# Patient Record
Sex: FEMALE | Employment: FULL TIME | URBAN - METROPOLITAN AREA
[De-identification: names, ages, dates, MRNs, and addresses within clinical notes are randomized per-mention and may not be internally consistent; named-entity substitution may affect disease eponyms.]

---

## 2024-03-01 ENCOUNTER — APPOINTMENT (OUTPATIENT)
Dept: GENERAL RADIOLOGY | Age: 39
End: 2024-03-01

## 2024-03-01 ENCOUNTER — APPOINTMENT (OUTPATIENT)
Dept: CT IMAGING | Age: 39
End: 2024-03-01

## 2024-03-01 ENCOUNTER — HOSPITAL ENCOUNTER (EMERGENCY)
Age: 39
Discharge: HOME | End: 2024-03-01
Attending: EMERGENCY MEDICINE

## 2024-03-01 VITALS
OXYGEN SATURATION: 96 % | WEIGHT: 235 LBS | RESPIRATION RATE: 16 BRPM | HEART RATE: 100 BPM | SYSTOLIC BLOOD PRESSURE: 137 MMHG | TEMPERATURE: 98.2 F | BODY MASS INDEX: 37.77 KG/M2 | DIASTOLIC BLOOD PRESSURE: 104 MMHG | HEIGHT: 66 IN

## 2024-03-01 DIAGNOSIS — J84.10 LUNG GRANULOMA (HCC): Primary | ICD-10-CM

## 2024-03-01 DIAGNOSIS — R03.0 ELEVATED BLOOD PRESSURE READING: ICD-10-CM

## 2024-03-01 LAB
ALBUMIN SERPL-MCNC: 4.7 G/DL (ref 3.5–5.2)
ALBUMIN/GLOB SERPL: 1.6 {RATIO} (ref 1–2.5)
ALP SERPL-CCNC: 89 U/L (ref 35–104)
ALT SERPL-CCNC: 42 U/L (ref 5–33)
ANION GAP SERPL CALCULATED.3IONS-SCNC: 12 MMOL/L (ref 9–17)
AST SERPL-CCNC: 30 U/L
BASOPHILS # BLD: 0.2 K/UL (ref 0–0.2)
BASOPHILS NFR BLD: 2 % (ref 0–2)
BILIRUB SERPL-MCNC: 0.7 MG/DL (ref 0.3–1.2)
BUN SERPL-MCNC: 18 MG/DL (ref 6–20)
CALCIUM SERPL-MCNC: 10.1 MG/DL (ref 8.6–10.4)
CHLORIDE SERPL-SCNC: 100 MMOL/L (ref 98–107)
CO2 SERPL-SCNC: 26 MMOL/L (ref 20–31)
CREAT SERPL-MCNC: 0.8 MG/DL (ref 0.5–0.9)
EOSINOPHIL # BLD: 0.1 K/UL (ref 0–0.4)
EOSINOPHILS RELATIVE PERCENT: 1 % (ref 1–4)
ERYTHROCYTE [DISTWIDTH] IN BLOOD BY AUTOMATED COUNT: 12.8 % (ref 12.5–15.4)
GFR SERPL CREATININE-BSD FRML MDRD: >60 ML/MIN/1.73M2
GLUCOSE SERPL-MCNC: 107 MG/DL (ref 70–99)
HCT VFR BLD AUTO: 39.7 % (ref 36–46)
HGB BLD-MCNC: 14 G/DL (ref 12–16)
LYMPHOCYTES NFR BLD: 1.8 K/UL (ref 1–4.8)
LYMPHOCYTES RELATIVE PERCENT: 26 % (ref 24–44)
MCH RBC QN AUTO: 33.4 PG (ref 26–34)
MCHC RBC AUTO-ENTMCNC: 35.4 G/DL (ref 31–37)
MCV RBC AUTO: 94.3 FL (ref 80–100)
MONOCYTES NFR BLD: 0.6 K/UL (ref 0.1–1.2)
MONOCYTES NFR BLD: 8 % (ref 2–11)
NEUTROPHILS NFR BLD: 63 % (ref 36–66)
NEUTS SEG NFR BLD: 4.5 K/UL (ref 1.8–7.7)
PLATELET # BLD AUTO: 375 K/UL (ref 140–450)
PMV BLD AUTO: 7.2 FL (ref 6–12)
POTASSIUM SERPL-SCNC: 3.9 MMOL/L (ref 3.7–5.3)
PROT SERPL-MCNC: 7.7 G/DL (ref 6.4–8.3)
RBC # BLD AUTO: 4.21 M/UL (ref 4–5.2)
SODIUM SERPL-SCNC: 138 MMOL/L (ref 135–144)
TROPONIN I SERPL HS-MCNC: <6 NG/L (ref 0–14)
WBC OTHER # BLD: 7.1 K/UL (ref 3.5–11)

## 2024-03-01 PROCEDURE — 36415 COLL VENOUS BLD VENIPUNCTURE: CPT

## 2024-03-01 PROCEDURE — 71045 X-RAY EXAM CHEST 1 VIEW: CPT

## 2024-03-01 PROCEDURE — 93005 ELECTROCARDIOGRAM TRACING: CPT | Performed by: PHYSICIAN ASSISTANT

## 2024-03-01 PROCEDURE — 99285 EMERGENCY DEPT VISIT HI MDM: CPT

## 2024-03-01 PROCEDURE — 84484 ASSAY OF TROPONIN QUANT: CPT

## 2024-03-01 PROCEDURE — 80053 COMPREHEN METABOLIC PANEL: CPT

## 2024-03-01 PROCEDURE — 2580000003 HC RX 258: Performed by: PHYSICIAN ASSISTANT

## 2024-03-01 PROCEDURE — 70450 CT HEAD/BRAIN W/O DYE: CPT

## 2024-03-01 PROCEDURE — 85025 COMPLETE CBC W/AUTO DIFF WBC: CPT

## 2024-03-01 RX ORDER — 0.9 % SODIUM CHLORIDE 0.9 %
1000 INTRAVENOUS SOLUTION INTRAVENOUS ONCE
Status: COMPLETED | OUTPATIENT
Start: 2024-03-01 | End: 2024-03-01

## 2024-03-01 RX ORDER — DEXTROAMPHETAMINE SACCHARATE, AMPHETAMINE ASPARTATE MONOHYDRATE, DEXTROAMPHETAMINE SULFATE AND AMPHETAMINE SULFATE 7.5; 7.5; 7.5; 7.5 MG/1; MG/1; MG/1; MG/1
1 CAPSULE, EXTENDED RELEASE ORAL EVERY MORNING
COMMUNITY
Start: 2024-01-26

## 2024-03-01 RX ORDER — IBUPROFEN 200 MG
600 TABLET ORAL EVERY 6 HOURS PRN
COMMUNITY

## 2024-03-01 RX ORDER — DEXTROAMPHETAMINE SACCHARATE, AMPHETAMINE ASPARTATE, DEXTROAMPHETAMINE SULFATE AND AMPHETAMINE SULFATE 3.75; 3.75; 3.75; 3.75 MG/1; MG/1; MG/1; MG/1
1 TABLET ORAL 2 TIMES DAILY
COMMUNITY
Start: 2024-02-28

## 2024-03-01 RX ORDER — VALACYCLOVIR HYDROCHLORIDE 500 MG/1
500 TABLET, FILM COATED ORAL DAILY
COMMUNITY
Start: 2023-10-09

## 2024-03-01 RX ADMIN — SODIUM CHLORIDE 1000 ML: 9 INJECTION, SOLUTION INTRAVENOUS at 14:46

## 2024-03-01 ASSESSMENT — ENCOUNTER SYMPTOMS
COUGH: 0
WHEEZING: 0
EYE PAIN: 0
COLOR CHANGE: 0
SORE THROAT: 0
BACK PAIN: 0
NAUSEA: 0
VOMITING: 0
EYE DISCHARGE: 0
RHINORRHEA: 0
EYE ITCHING: 0

## 2024-03-01 ASSESSMENT — PAIN - FUNCTIONAL ASSESSMENT: PAIN_FUNCTIONAL_ASSESSMENT: NONE - DENIES PAIN

## 2024-03-01 ASSESSMENT — HEART SCORE: ECG: 0

## 2024-03-01 NOTE — DISCHARGE INSTRUCTIONS
Please call your primary care physician's office on Monday to schedule follow-up for your symptoms and for any other workup or referral that may be needed for the granuloma    Please return to the emergency department for chest pain shortness of breath or any other emergent concerns

## 2024-03-01 NOTE — ED PROVIDER NOTES
with the current blood pressure.  I would definitely recommend that she follow-up with her PCP as the blood pressure she is reporting from earlier would not explain all of her symptoms.  Patient states that she is very stressed out and definitely believes that she has hypertension secondary to the stress she is experiencing at work. [LUIS]      ED Course User Index  [LUIS] Mehreen Armstrong PA-C        Vitals:    Vitals:    03/01/24 1416 03/01/24 1542 03/01/24 1630   BP: (!) 138/92 (!) 135/101 (!) 137/104   Pulse: (!) 104 95 100   Resp: 18  16   Temp: 98.2 °F (36.8 °C)     TempSrc: Oral     SpO2: 99%  96%   Weight: 106.6 kg (235 lb)     Height: 1.676 m (5' 6\")       -------------------------  BP: (!) 137/104, Temp: 98.2 °F (36.8 °C), Pulse: 100, Respirations: 16      RE-EVALUATION:  See ED Course notes above.    The patient and/or family and I have discussed the diagnosis and risks, and we agree with discharging home to follow-up with their pertinent providers. The patient appears stable for discharge and has been instructed to return immediately for new concerning symptoms or if the symptoms worsen in any way. The patient understands that at this time there is no evidence for a more malignant underlying process, but the patient also understands that early in the process of an illness or injury, an emergency department workup can be falsely reassuring. Routine discharge counseling was given, and the patient understands that worsening, changing or persistent symptoms should prompt an immediate call or follow up with their primary physician or return to the emergency department.    I have reviewed the disposition diagnosis with the patient and or their family/guardian. I have answered their questions and given discharge instructions. They voiced understanding of these instructions and did not have any further questions or complaints.     This patient was seen by the attending physician and they agreed with the assessment  and plan.    CONSULTS:  None    PROCEDURES:  None    FINAL IMPRESSION      1. Lung granuloma (HCC)    2. Elevated blood pressure reading          DISPOSITION / PLAN     CONDITION ON DISPOSITION:   Good / Stable for discharge.  Stable    PATIENT REFERRED TO:  Jossie Sherman, SVETA - CNP  [address]  [facility] [address]  [phone]    Schedule an appointment as soon as possible for a visit       [facility] Emergency Department  [address] [facility]  [place] [id number] [phone]    As needed, If symptoms worsen      DISCHARGE MEDICATIONS:  Current Discharge Medication List          Mehreen Armstrong PA-C   Emergency Medicine Physician Assistant    (Please note that portions of this note were completed with a voice recognition program.  Efforts were made to edit the dictations but occasionally words aremis-transcribed.)       Mehreen Armstrong PA-C  03/01/24 1604       Mehreen Armstrong PA-C  03/01/24 1608       Mehreen Armstrong PA-C  03/01/24 1701

## 2024-03-01 NOTE — ED PROVIDER NOTES
Narrative:    EXAMINATION:   CT OF THE HEAD WITHOUT CONTRAST  3/1/2024 11:59 am     TECHNIQUE:   CT of the head was performed without the administration of intravenous   contrast. Automated exposure control, iterative reconstruction, and/or weight   based adjustment of the mA/kV was utilized to reduce the radiation dose to as   low as reasonably achievable.     COMPARISON:   None.     HISTORY:   ORDERING SYSTEM PROVIDED HISTORY: hypertension   TECHNOLOGIST PROVIDED HISTORY:     hypertension   Reason for Exam: HTN     FINDINGS:   BRAIN/VENTRICLES: There is no acute intracranial hemorrhage, mass effect or   midline shift.  No abnormal extra-axial fluid collection.  The gray-white   differentiation is maintained without evidence of an acute infarct.  There is   no evidence of hydrocephalus.     ORBITS: The visualized portion of the orbits demonstrate no acute abnormality.     SINUSES: The visualized paranasal sinuses and mastoid air cells demonstrate   no acute abnormality.     SOFT TISSUES/SKULL:  No acute abnormality of the visualized skull.  Benign   appearing nodule in the right frontal scalp                       XR CHEST (SINGLE VIEW FRONTAL) (Final result)  Result time 03/01/24 14:53:42  Final result by Sara Marroquin MD (03/01/24 14:53:42)                Impression:    No radiographic evidence of acute cardiopulmonary pathology.             Narrative:    EXAMINATION:   ONE XRAY VIEW OF THE CHEST     3/1/2024 2:39 pm     COMPARISON:   None.     HISTORY:   ORDERING SYSTEM PROVIDED HISTORY: hypertension   TECHNOLOGIST PROVIDED HISTORY:   hypertension   Reason for Exam: Hypertension, patient states increased difficulty to take a   full breath in / chest-tightness     FINDINGS:   Mediastinum/Heart: The mediastinal contours are normal. The heart appears   normal in size.     Lungs: The lungs are clear.  Granulomas in the right lung.     Pleura: No pleural effusion. No pneumothorax.                 PERTINENT  ATTENDING PHYSICIAN COMMENTS:    EKG: Emergency physician independent interpretation: Sinus 98 with frequent PVCs.  Axis 77, , QRS 84, .  Nonspecific ST change noted.  No old EKGs to compare.    The patient presents with extremity heaviness.  Her systolic BP was in the 160s at work, so she decided to come in.  She has history of anxiety.  She states that she felt like she could be having a stroke.  She says it felt like it was difficult for her to speak.  She does not have any focal deficits, but says that she feels heaviness in her upper and lower extremities.  The patient denies headache.  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  She says she took a metoprolol but does not normally take blood pressure medicines.    On exam, the patient appears anxious and tearful but has no focal deficits.  She has no facial droop.  She moves all extremities normally.  She has normal heart and lung sounds.  CT imaging is reassuring, as are her labs and EKG.  We suggest that she follow up as an outpatient with her doctor to discuss HTN issues and anxiety.  My index of suspicion for ACS is also low.  She is discharged in good condition.     Caren Rosales MD  03/02/24 8434

## 2024-03-04 LAB
EKG ATRIAL RATE: 98 BPM
EKG P AXIS: 69 DEGREES
EKG P-R INTERVAL: 162 MS
EKG Q-T INTERVAL: 362 MS
EKG QRS DURATION: 84 MS
EKG QTC CALCULATION (BAZETT): 462 MS
EKG R AXIS: 77 DEGREES
EKG T AXIS: 41 DEGREES
EKG VENTRICULAR RATE: 98 BPM